# Patient Record
Sex: FEMALE | Race: WHITE
[De-identification: names, ages, dates, MRNs, and addresses within clinical notes are randomized per-mention and may not be internally consistent; named-entity substitution may affect disease eponyms.]

---

## 2020-07-13 ENCOUNTER — HOSPITAL ENCOUNTER (EMERGENCY)
Dept: HOSPITAL 56 - MW.ED | Age: 25
Discharge: HOME | End: 2020-07-13
Payer: COMMERCIAL

## 2020-07-13 DIAGNOSIS — S61.214A: Primary | ICD-10-CM

## 2020-07-13 DIAGNOSIS — Z23: ICD-10-CM
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
## 2020-07-13 NOTE — EDM.PDOC
ED HPI GENERAL MEDICAL PROBLEM





- General


Stated Complaint: RIGHT RING FINGER LACERATION


Time Seen by Provider: 07/13/20 19:32


Source of Information: Reports: Patient


History Limitations: Reports: No Limitations





- History of Present Illness


INITIAL COMMENTS - FREE TEXT/NARRATIVE: 





25-year-old female presents with laceration to the right fourth digit 30 minutes

prior to arrival.  She was slicing cucumber with her right hand in the kitchen 

when it happened.  She sliced off a piece of her finger over the slicer, it was 

actively bleeding and she wrapped it with a Band-Aid and dressing.  Her tetanus 

is not up-to-date.





ROS: A 10-point review of systems, other than pertinent positives and negatives 

as stated per HPI, is otherwise negative


________________________________________________________________________________





PHYSICAL EXAM





General: AOx4, GCS = 15, No distress


HEENT: dry mucous membrane


Neck: supple, no meningismus, no Kernig or Brudzinski


Cardiac: [id number] RRR


Respiratory: CTAB, no crackles or rales, no wheezing


Abdomen: Soft, nontender, no rebound or guarding, nondistended, no pulsatile 

mass.


Back: nontender


Musculoskeletal: NVI distally, 1cm by 4cm superficial skin avulsion over the do

rsal aspect of right 4th digit middle phalanx, nml ROM with extending right 4th 

MCP/PIP/DIP joint. 


Neuro: No focal deficits.











- Related Data


                                    Allergies











Allergy/AdvReac Type Severity Reaction Status Date / Time


 


No Known Allergies Allergy   Verified 07/13/20 19:50














Review of Systems





- Review of Systems


Review Of Systems: Comprehensive ROS is negative, except as noted in HPI.





ED EXAM, GENERAL





- Physical Exam


Exam: See Below (see dictation)





ED TRAUMA EXTREMITY PROCEDURES





- Laceration/Wound Repair


  ** Right Dorsal Digit - 4th (Ring)


Lac/Wound Length In cm: 4


Appearance: Superficial


Distal NVT: Neuro & Vascular Intact, No Tendon Injury


Skin Prep: Saline


Saline Irrigation (cc's): 20


Exploration/Debridement/Repair: Wound Explored, In a Bloodless Field, Explored 

to Base, No Foreign Material Found


Sterile Dressing Applied: Other (wet to dry dressing)


Tetanus Status Addressed: Yes


Complications: No





Course





- Vital Signs


Last Recorded V/S: 


                                Last Vital Signs











Temp  98.0 F   07/13/20 19:40


 


Pulse  84   07/13/20 19:40


 


Resp  18   07/13/20 19:40


 


BP  130/92 H  07/13/20 19:40


 


Pulse Ox  97   07/13/20 19:40














- Orders/Labs/Meds


Orders: 


                               Active Orders 24 hr











 Category Date Time Status


 


 Communication Order [RC] STAT Care  07/13/20 19:58 Ordered


 


 Vaccines to be Administered [RC] PER UNIT ROUTINE Care  07/13/20 19:58 Ordered











Meds: 


Medications














Discontinued Medications














Generic Name Dose Route Start Last Admin





  Trade Name Neri  PRN Reason Stop Dose Admin


 


Diphtheria/Tetanus/Acell Pertussis  0.5 ml  07/13/20 19:58 





  Adacel  IM  07/13/20 19:59 





  .ONCE ONE  














- Re-Assessments/Exams


Free Text/Narrative Re-Assessment/Exam: 





07/13/20 2010


After treatments, she improved clinically and is stable for discharge. I 

performed a repeat examination and the patient has not demonstrated any new 

abnormal findings. Patient exhibits normal vital signs. I advised the patient to

 return to the ER for reevaluation if symptoms worsened, and to follow up with 

their PCP within 2-3 days.





________________________________________________________________________________





MEDICAL DECISION MAKING: I reviewed the patients past medical records, lab and 

radiographic findings. I discussed the case with the patient. My differential 

diagnosis included: Skin avulsion, laceration, tendon injury.  She has normal 

range of motion at the PIP joint, MCP joint, DIP joint with extension, the 

avulsion is superficial and I do not see underlying tendon injury. Wound was 

irrigated and cleaned and dressed with wet to dry dressing.  I gave her strict 

return precautions for fever, chills, swelling to the right fourth digit, 

worsening pain, purulent drainage.











Departure





- Departure


Time of Disposition: 20:00


Disposition: Home, Self-Care 01


Condition: Good


Clinical Impression: 


 Avulsion of skin of finger








- Discharge Information


*PRESCRIPTION DRUG MONITORING PROGRAM REVIEWED*: Not Applicable


*COPY OF PRESCRIPTION DRUG MONITORING REPORT IN PATIENT NAN: Not Applicable


Instructions:  Wound Care, Adult, Skin Tear, Easy-to-Read


Referrals: 


PCP,None [Primary Care Provider] - 1 Week


Additional Instructions: 


The following information is given to patients seen in the emergency department 

who are being discharged to home. This information is to outline your options 

for follow-up care. We provide all patients seen in our emergency department 

with a follow-up referral.





The need for follow-up, as well as the timing and circumstances, are variable 

depending upon the specifics of your emergency department visit.





If you don't have a primary care physician on staff, we will provide you with a 

referral. We always advise you to contact your personal physician following an 

emergency department visit to inform them of the circumstance of the visit and 

for follow-up with them and/or the need for any referrals to a consulting 

specialist.





The emergency department will also refer you to a specialist when appropriate. 

This referral assures that you have the opportunity for follow-up care with a 

specialist. All of these measure are taken in an effort to provide you with 

optimal care, which includes your follow-up.





Under all circumstances we always encourage you to contact your private 

physician who remains a resource for coordinating your care. When calling for 

follow-up care, please make the office aware that this follow-up is from your 

recent emergency room visit. If for any reason you are refused follow-up, please

contact the [facility] Emergency Department

at [phone] and asked to speak to the emergency department charge nurse.





If you do not have a primary care doctor, please follow up with the clinics 

below within 3-5 days. 





[facility]


[address] [place] [id number]


Phone: [phone]


Fax: [phone]





[facility]


[address] [place] [address]


Phone: [phone]


Fax: [phone]








Sepsis Event Note (ED)





- Focused Exam


Vital Signs: 


                                   Vital Signs











  Temp Pulse Resp BP Pulse Ox


 


 07/13/20 19:40  98.0 F  84  18  130/92 H  97














- My Orders


Last 24 Hours: 


My Active Orders





07/13/20 19:58


Communication Order [RC] STAT 


Vaccines to be Administered [RC] PER UNIT ROUTINE 














- Assessment/Plan


Last 24 Hours: 


My Active Orders





07/13/20 19:58


Communication Order [RC] STAT 


Vaccines to be Administered [RC] PER UNIT ROUTINE